# Patient Record
Sex: MALE | Race: WHITE
[De-identification: names, ages, dates, MRNs, and addresses within clinical notes are randomized per-mention and may not be internally consistent; named-entity substitution may affect disease eponyms.]

---

## 2020-01-28 ENCOUNTER — HOSPITAL ENCOUNTER (EMERGENCY)
Dept: HOSPITAL 25 - ED | Age: 25
Discharge: HOME | End: 2020-01-28
Payer: COMMERCIAL

## 2020-01-28 VITALS — SYSTOLIC BLOOD PRESSURE: 162 MMHG | DIASTOLIC BLOOD PRESSURE: 100 MMHG

## 2020-01-28 DIAGNOSIS — F19.10: Primary | ICD-10-CM

## 2020-01-28 LAB
ALBUMIN SERPL BCG-MCNC: 4.5 G/DL (ref 3.2–5.2)
ALBUMIN/GLOB SERPL: 1.7 {RATIO} (ref 1–3)
ALP SERPL-CCNC: 59 U/L (ref 34–104)
ALT SERPL W P-5'-P-CCNC: 17 U/L (ref 7–52)
ANION GAP SERPL CALC-SCNC: 11 MMOL/L (ref 2–11)
AST SERPL-CCNC: 23 U/L (ref 13–39)
BASOPHILS # BLD AUTO: 0.1 10^3/UL (ref 0–0.2)
BUN SERPL-MCNC: 14 MG/DL (ref 6–24)
BUN/CREAT SERPL: 13 (ref 8–20)
CALCIUM SERPL-MCNC: 9.6 MG/DL (ref 8.6–10.3)
CHLORIDE SERPL-SCNC: 98 MMOL/L (ref 101–111)
EOSINOPHIL # BLD AUTO: 0.1 10^3/UL (ref 0–0.6)
GLOBULIN SER CALC-MCNC: 2.6 G/DL (ref 2–4)
GLUCOSE SERPL-MCNC: 150 MG/DL (ref 70–100)
HCO3 SERPL-SCNC: 28 MMOL/L (ref 22–32)
HCT VFR BLD AUTO: 45 % (ref 42–52)
HGB BLD-MCNC: 15.8 G/DL (ref 14–18)
LYMPHOCYTES # BLD AUTO: 1.7 10^3/UL (ref 1–4.8)
MCH RBC QN AUTO: 31 PG (ref 27–31)
MCHC RBC AUTO-ENTMCNC: 35 G/DL (ref 31–36)
MCV RBC AUTO: 88 FL (ref 80–94)
MONOCYTES # BLD AUTO: 0.4 10^3/UL (ref 0–0.8)
NEUTROPHILS # BLD AUTO: 3.3 10^3/UL (ref 1.5–7.7)
NRBC # BLD AUTO: 0 10^3/UL
NRBC BLD QL AUTO: 0.1
PLATELET # BLD AUTO: 312 10^3/UL (ref 150–450)
POTASSIUM SERPL-SCNC: 3.2 MMOL/L (ref 3.5–5)
PROT SERPL-MCNC: 7.1 G/DL (ref 6.4–8.9)
RBC # BLD AUTO: 5.1 10^6 /UL (ref 4.18–5.48)
SODIUM SERPL-SCNC: 137 MMOL/L (ref 135–145)
TROPONIN I SERPL-MCNC: 0 NG/ML (ref ?–0.03)
TSH SERPL-ACNC: 2.52 MCIU/ML (ref 0.34–5.6)
WBC # BLD AUTO: 5.7 10^3/UL (ref 3.5–10.8)

## 2020-01-28 PROCEDURE — 85025 COMPLETE CBC W/AUTO DIFF WBC: CPT

## 2020-01-28 PROCEDURE — 96360 HYDRATION IV INFUSION INIT: CPT

## 2020-01-28 PROCEDURE — 80053 COMPREHEN METABOLIC PANEL: CPT

## 2020-01-28 PROCEDURE — 81003 URINALYSIS AUTO W/O SCOPE: CPT

## 2020-01-28 PROCEDURE — 96361 HYDRATE IV INFUSION ADD-ON: CPT

## 2020-01-28 PROCEDURE — 99282 EMERGENCY DEPT VISIT SF MDM: CPT

## 2020-01-28 PROCEDURE — 84443 ASSAY THYROID STIM HORMONE: CPT

## 2020-01-28 PROCEDURE — 84484 ASSAY OF TROPONIN QUANT: CPT

## 2020-01-28 PROCEDURE — 86140 C-REACTIVE PROTEIN: CPT

## 2020-01-28 PROCEDURE — 93005 ELECTROCARDIOGRAM TRACING: CPT

## 2020-01-28 PROCEDURE — 36415 COLL VENOUS BLD VENIPUNCTURE: CPT

## 2020-01-28 RX ADMIN — SODIUM CHLORIDE ONE MLS/HR: 900 IRRIGANT IRRIGATION at 21:22

## 2020-01-28 NOTE — ED
Substance Abuse/Use





- HPI Summary


HPI Summary: 





Patient states ringing in his right ear, accelerated heart rate, anxiety after 

taking bren at 7 PM, then smoking marijuana, then smoking "some kind of oil" 

at 8 PM.  Denies any other symptoms, pain or injury.  Medical history is none.





- History Of Current Complaint


Chief Complaint: EDSubstanceAbuse


Stated Complaint: HEART RACING PER PT


Time Seen by Provider: 01/28/20 20:58


Hx Obtained From: Patient


Onset/Duration  of Drug/ETOH Abuse: Hours


Overdose Characteristics: Oral, Inhalation


Severity Initially: Moderate


Severity Currently: Moderate


Character: Anxious





- Allergies/Home Medications


Home Medications: 


 Home Medications





NK [No Home Medications Reported]  01/28/20 [History Confirmed 01/28/20]











PMH/Surg Hx/FS Hx/Imm Hx


Endocrine/Hematology History: 


   Denies: Hx Anticoagulant Therapy


Cardiovascular History: 


   Denies: Hx Pacemaker/ICD


 History: 


   Denies: Hx Dialysis


Sensory History: 


   Denies: Hx Eye Prosthesis


Opthamlomology History: 


   Denies: Hx Legally Blind


EENT History: 


   Denies: Hx Deafness


Neurological History: 


   Denies: Hx Dementia


Infectious Disease History: No


Infectious Disease History: 


   Denies: Traveled Outside the [place] in Last 30 Days





- Family History


Known Family History: Positive: Non-Contributory





- Social History


Alcohol Use: Weekly


Substance Use Type: Reports: Marijuana, Synthetic Drugs


Hx Tobacco Use: No





Review of Systems


Constitutional: Negative


Eyes: Negative


Positive: Other - ringing in right ear


Positive: Palpitations


Respiratory: Negative


Gastrointestinal: Negative


Genitourinary: Negative


Musculoskeletal: Negative


Skin: Negative


Neurological: Negative


Positive: Anxious


All Other Systems Reviewed And Are Negative: Yes





Physical Exam





- Summary


Physical Exam Summary: 





Patient alert and oriented although somewhat rambling in speech.


Triage Information Reviewed: Yes


Vital Signs On Initial Exam: 


 Initial Vitals











Temp Pulse Resp BP Pulse Ox


 


 98.1 F   127   22   158/122   99 


 


 01/28/20 20:46  01/28/20 20:46  01/28/20 20:46  01/28/20 20:46  01/28/20 20:46











Vital Signs Reviewed: Yes


Appearance: Positive: Well-Appearing


Skin: Positive: Warm


Head/Face: Positive: Normal Head/Face Inspection


Eyes: Positive: Normal


Neck: Positive: Supple


Respiratory/Lung Sounds: Positive: Clear to Auscultation


Cardiovascular: Positive: Normal


Abdomen Description: Positive: Nontender


Musculoskeletal: Positive: Normal


Neurological: Positive: Normal


Psychiatric: Positive: Normal


AVPU Assessment: Alert





- [place] Coma Scale


Best Eye Response: 4 - Spontaneous


Best Motor Response: 6 - Obeys Commands


Best Verbal Response: 5 - Oriented


Coma Scale Total: 15





Procedures





- Sedation


Patient Received Moderate/Deep Sedation with Procedure: No





Diagnostics





- Vital Signs


 Vital Signs











  Temp Pulse Resp BP Pulse Ox


 


 01/28/20 20:46  98.1 F  127  22  158/122  99














- Laboratory


Result Diagrams: 


 01/28/20 21:15





 01/28/20 21:15


Lab Statement: Any lab studies that have been ordered have been reviewed, and 

results considered in the medical decision making process.





Course/Dx





- Course


Course Of Treatment: Patient states ringing in his right ear, accelerated heart 

rate, anxiety after taking bren at 7 PM, then smoking marijuana, then smoking 

"some kind of oil" at 8 PM.  Denies any other symptoms, pain or injury.  

Medical history is none.  Heart rate 127.  Respiratory rate 22.  Vital signs 

otherwise unremarkable.  Labs unremarkable.  Patient observed for a couple 

hours with improvement in symptoms.  Patient clinically sober on reexam, 

discharged into company of friend.





- Diagnoses


Provider Diagnoses: 


 Substance abuse








Discharge ED





- Sign-Out/Discharge


Documenting (check all that apply): Patient Departure





- Discharge Plan


Condition: Stable


Disposition: HOME


Patient Education Materials:  Polysubstance Abuse (ED)


Referrals: 


No Primary Care Phys,NOPCP [Primary Care Provider] - 


Additional Instructions: 


Return to the ED for any new or worsening symptoms.





- Billing Disposition and Condition


Condition: STABLE


Disposition: Home